# Patient Record
Sex: FEMALE | Race: WHITE | Employment: OTHER
[De-identification: names, ages, dates, MRNs, and addresses within clinical notes are randomized per-mention and may not be internally consistent; named-entity substitution may affect disease eponyms.]

---

## 2017-06-10 ENCOUNTER — HEALTH MAINTENANCE LETTER (OUTPATIENT)
Age: 57
End: 2017-06-10

## 2018-06-08 ENCOUNTER — OFFICE VISIT (OUTPATIENT)
Dept: FAMILY MEDICINE | Facility: CLINIC | Age: 58
End: 2018-06-08
Payer: COMMERCIAL

## 2018-06-08 ENCOUNTER — NURSE TRIAGE (OUTPATIENT)
Dept: NURSING | Facility: CLINIC | Age: 58
End: 2018-06-08

## 2018-06-08 VITALS
RESPIRATION RATE: 16 BRPM | BODY MASS INDEX: 24.93 KG/M2 | HEIGHT: 62 IN | DIASTOLIC BLOOD PRESSURE: 81 MMHG | SYSTOLIC BLOOD PRESSURE: 139 MMHG | WEIGHT: 135.5 LBS | TEMPERATURE: 98.5 F | OXYGEN SATURATION: 98 % | HEART RATE: 87 BPM

## 2018-06-08 DIAGNOSIS — L30.9 PERIORBITAL DERMATITIS: Primary | ICD-10-CM

## 2018-06-08 PROCEDURE — 99213 OFFICE O/P EST LOW 20 MIN: CPT | Performed by: FAMILY MEDICINE

## 2018-06-08 NOTE — PATIENT INSTRUCTIONS
Managing Dermatitis      After bathing, gently pat your skin dry (don t rub). Apply moisturizer while your skin is still damp.   To manage your symptoms and help reduce the severity and frequency, try these self-care tips:  Caring for your skin    Use a gentle, fragrance-free cleanser (or nonsoap cleanser) for bathing. Rinse well. Pat skin dry.    Take warm, not hot, baths or showers. Try to limit them to no more that 10 to 15 minutes.     Use moisturizer liberally right after you bathe, while your skin is still damp.    Avoid scratching because it will cause more damage to your skin.     Topical, over-the-counter hydrocortisone cream may help control mild symptoms.   Controlling your environment    Avoid extreme heat or cold.    Avoid very humid or very dry air.    If your home or office air is very dry, use a humidifier.    Avoid allergens, such as dust, that may be present in bedding, carpets, plush toys, or rugs.    Know that pet hair and dander can cause flare-ups.  Seeking medical treatment  Another way to keep symptoms under control is to seek medical treatment. Talk with your healthcare provider about the type of treatment that may work best for you. Your provider may prescribe treatments such as the following:    Topical treatments to put on the skin daily    Medicines taken by mouth (oral medicines), such as antihistamines, antibiotics, or corticosteroids    In severe cases shots (injections) may be needed to control the symptoms. You may even need antibiotics if skin infections occur.  Treatments don t work the same way for every person. So if your symptoms continue or get worse, ask your healthcare provider about other treatments.  Making lifestyle choices    Manage the stress in your life.    Wear loose-fitting cotton clothing that does not bind or rub your skin.    Avoid contact with wool or other scratchy fabrics.    Use fragrance-free products.  Getting good results  Now that you know more about  atopic dermatitis, the next step is up to you. Follow your healthcare provider s treatment plan and your self-care routine. This will help bring atopic dermatitis under control. If your symptoms persist, be sure to let your health care provider know.   Date Last Reviewed: 2/1/2017 2000-2017 [employer]. [address] [place] [address], [place] [address]. All rights reserved. This information is not intended as a substitute for professional medical care. Always follow your healthcare professional's instructions.

## 2018-06-08 NOTE — NURSING NOTE
"Chief Complaint   Patient presents with     Eye Problem     fluid pooling below left eye for a day     initial /81 (BP Location: Right arm, Cuff Size: Adult Regular)  Pulse 87  Temp 98.5  F (36.9  C) (Oral)  Resp 16  Ht 5' 2\" (1.575 m)  Wt 135 lb 8 oz (61.5 kg)  SpO2 98%  BMI 24.78 kg/m2 Estimated body mass index is 24.78 kg/(m^2) as calculated from the following:    Height as of this encounter: 5' 2\" (1.575 m).    Weight as of this encounter: 135 lb 8 oz (61.5 kg).  BP completed using cuff size: regular.  L  arm      Health Maintenance that is potentially due pending provider review:  Mammogram and Pap Smear    MAMMO/COLON--Gave pt phone number, and pended order for Mammo and/or Colonoscopy    Sinan Saeed ma  "

## 2018-06-08 NOTE — PROGRESS NOTES
"  SUBJECTIVE:   Symone Wellington is a 57 year old female who presents to clinic today for the following health issues:    Chief Complaint   Patient presents with     Eye Problem     fluid pooling below left eye for a day   57-year-old who presents to the clinic for evaluation of swelling and redness underneath her left eye.  Mild swelling and redness has been present for the past 3 days.  She denies any fevers or chills.  Denies any photophobia, vision changes, or excessive lacrimation.  Denies any pain.  She has not tried any medications or ointments for it.  She denies any head or neck surgeries.  Denies any recent eye procedures.    Problem list and histories reviewed & adjusted, as indicated.  Additional history: as documented    There is no problem list on file for this patient.    Past Surgical History:   Procedure Laterality Date     ENT SURGERY      ear surgery       Social History   Substance Use Topics     Smoking status: Never Smoker     Smokeless tobacco: Never Used     Alcohol use No     Family History   Problem Relation Age of Onset     HEART DISEASE Mother 60     quadruple bypass , [place]      HEART DISEASE Father      DIABETES Mother      Arthritis Father            Reviewed and updated as needed this visit by clinical staff  Tobacco  Meds       Reviewed and updated as needed this visit by Provider         ROS:  Constitutional, HEENT, cardiovascular, pulmonary, gi and gu systems are negative, except as otherwise noted.    OBJECTIVE:     /81 (BP Location: Right arm, Cuff Size: Adult Regular)  Pulse 87  Temp 98.5  F (36.9  C) (Oral)  Resp 16  Ht 5' 2\" (1.575 m)  Wt 135 lb 8 oz (61.5 kg)  SpO2 98%  BMI 24.78 kg/m2  Body mass index is 24.78 kg/(m^2).  GENERAL: healthy, alert and no distress  EYES: Eyes grossly normal to inspection, PERRL and conjunctivae and sclerae normal.  Mild swelling of the left lower eyelid with mild erythema.  No fluctuance or bulging mass was noted.  No excessive " lacrimation or eye dryness was noted  RESP: lungs clear to auscultation - no rales, rhonchi or wheezes  CV: regular rate and rhythm, normal S1 S2, no S3 or S4, no murmur, click or rub, no peripheral edema and peripheral pulses strong    Diagnostic Test Results:  none     ASSESSMENT/PLAN:   1. Periorbital dermatitis  Assessment: Isolated swelling of the left lower eyelid is consistent with dermatitis.  Other differential diagnosis includes cellulitis, colitis, dacryoadenitis, or lacrimal gland tumor.  All of which are less likely.  Dermatitis appears to be superficial.  No tenderness to palpation or bulging was noted which makes dacryoadenitis less likely.  The patient was advised to monitor symptoms and apply a moist cloth to alleviate the redness and the swelling.  She was advised to return to clinic if symptoms persisted or got worse.  She was advised to schedule an appointment with an ophthalmologist if she noticed any vision changes  or bulging mass on the medial nasal bridge.    Annalise Rosas MD  [facility]

## 2018-06-08 NOTE — MR AVS SNAPSHOT
After Visit Summary   6/8/2018    Symone Wellington    MRN: [id number]           Patient Information     Date Of Birth          1960        Visit Information        Provider Department      6/8/2018 2:20 PM Annalise Rosas MD [facility]        Today's Diagnoses     Periorbital dermatitis    -  1      Care Instructions      Managing Dermatitis      After bathing, gently pat your skin dry (don t rub). Apply moisturizer while your skin is still damp.   To manage your symptoms and help reduce the severity and frequency, try these self-care tips:  Caring for your skin    Use a gentle, fragrance-free cleanser (or nonsoap cleanser) for bathing. Rinse well. Pat skin dry.    Take warm, not hot, baths or showers. Try to limit them to no more that 10 to 15 minutes.     Use moisturizer liberally right after you bathe, while your skin is still damp.    Avoid scratching because it will cause more damage to your skin.     Topical, over-the-counter hydrocortisone cream may help control mild symptoms.   Controlling your environment    Avoid extreme heat or cold.    Avoid very humid or very dry air.    If your home or office air is very dry, use a humidifier.    Avoid allergens, such as dust, that may be present in bedding, carpets, plush toys, or rugs.    Know that pet hair and dander can cause flare-ups.  Seeking medical treatment  Another way to keep symptoms under control is to seek medical treatment. Talk with your healthcare provider about the type of treatment that may work best for you. Your provider may prescribe treatments such as the following:    Topical treatments to put on the skin daily    Medicines taken by mouth (oral medicines), such as antihistamines, antibiotics, or corticosteroids    In severe cases shots (injections) may be needed to control the symptoms. You may even need antibiotics if skin infections occur.  Treatments don t work the same way for every person. So if your  symptoms continue or get worse, ask your healthcare provider about other treatments.  Making lifestyle choices    Manage the stress in your life.    Wear loose-fitting cotton clothing that does not bind or rub your skin.    Avoid contact with wool or other scratchy fabrics.    Use fragrance-free products.  Getting good results  Now that you know more about atopic dermatitis, the next step is up to you. Follow your healthcare provider s treatment plan and your self-care routine. This will help bring atopic dermatitis under control. If your symptoms persist, be sure to let your health care provider know.   Date Last Reviewed: 2/1/2017 2000-2017 [employer] [address] [place] [address]. All rights reserved. This information is not intended as a substitute for professional medical care. Always follow your healthcare professional's instructions.                Follow-ups after your visit        Who to contact     If you have questions or need follow up information about today's clinic visit or your schedule please contact [facility] directly at [phone].  Normal or non-critical lab and imaging results will be communicated to you by [email or web address], letter or phone within 4 business days after the clinic has received the results. If you do not hear from us within 7 days, please contact the clinic through [facility] or phone. If you have a critical or abnormal lab result, we will notify you by phone as soon as possible.  Submit refill requests through [facility] or call your pharmacy and they will forward the refill request to us. Please allow 3 business days for your refill to be completed.          Additional Information About Your Visit        [facility] Information     [facility] gives you secure access to your electronic health record. If you see a primary care provider, you can also send messages to your care team and make appointments. If you have questions, please call your primary care  "clinic.  If you do not have a primary care provider, please call [phone] and they will assist you.        Care EveryWhere ID     This is your Care EveryWhere ID. This could be used by other organizations to access your [facility] medical records  [id number]        Your Vitals Were     Pulse Temperature Respirations Height Pulse Oximetry BMI (Body Mass Index)    87 98.5  F (36.9  C) (Oral) 16 5' 2\" (1.575 m) 98% 24.78 kg/m2       Blood Pressure from Last 3 Encounters:   06/08/18 139/81   11/17/15 131/81   06/26/15 118/74    Weight from Last 3 Encounters:   06/08/18 135 lb 8 oz (61.5 kg)   06/26/15 130 lb (59 kg)   05/20/15 138 lb (62.6 kg)              Today, you had the following     No orders found for display       Primary Care Provider Fax #    Physician No Ref-Primary [phone]       No address on file        Equal Access to Services     NYA ESTRADA : Hadii shikha riverao Sokathrynali, waaxda luqadaha, qaybta kaalmada adeegyada, rishabh leigh . So [facility] [phone].    ATENCIÓN: Si habla español, tiene a rya disposición servicios gratuitos de asistencia lingüística. Llame al [phone].    We comply with applicable federal civil rights laws and [place] laws. We do not discriminate on the basis of race, color, national origin, age, disability, sex, sexual orientation, or gender identity.            Thank you!     Thank you for choosing [facility]  for your care. Our goal is always to provide you with excellent care. Hearing back from our patients is one way we can continue to improve our services. Please take a few minutes to complete the written survey that you may receive in the mail after your visit with us. Thank you!             Your Updated Medication List - Protect others around you: Learn how to safely use, store and throw away your medicines at www.disposemymeds.org.      Notice  As of 6/8/2018  3:00 PM    You have not been prescribed any medications.      "

## 2018-06-08 NOTE — TELEPHONE ENCOUNTER
"\" Bag of fluid - swelling is 1 inch  and  pinkish -redness under Left eye without pain or itching  started  72 hours ago  And no known cause .  72 hours ago Awoke with R earache and cracking with sounding , but Hx of this as child a lot but not recent  And this resolved  .  Currently : no fever or vision problem and eye looks normal . Triage guideline for Redness , localized - adult with disposition see provider with today if possible .  Agrees to go into be seen today .  .Kelly Reddy RN [facility].      Reason for Disposition    [1] Looks infected (spreading redness, pus) AND [2] no fever    Additional Information    Negative: [1] Sudden onset of rash (within last 2 hours) AND [2] difficulty with breathing or swallowing    Negative: Sounds like a life-threatening emergency to the triager    Negative: Poison ivy, oak, or sumac contact suspected    Negative: Insect bite(s) suspected    Negative: Ringworm suspected (i.e., round pink patch, sometimes looks like ring, usually 1/2 to 1 inch [12-25 mm],  in size, slowly increasing in size)    Negative: Athlete's Foot suspected (i.e., itchy rash between the toes)    Negative: Jock Itch suspected (i.e., itchy rash on inner thighs near genital area)    Negative: Wound infection suspected (i.e., pain, spreading redness, or pus; in a cut, puncture, scrape or sutured wound)    Negative: Impetigo suspected  (i.e., painless infected superficial small sores, less than 1 inch or 2.5 cm, often covered by a soft, yellow-brown scab or crust; sometimes occurring near nasal openings)    Negative: Shingles suspected (i.e., painful rash, multiple small blisters grouped together in one area of body; dermatomal distribution)    Negative: Rash of external female genital area (vulva)    Negative: Rash of penis or scrotum    Negative: Small spot, skin growth, or mole    Negative: Sores or skin ulcer, not a rash    Negative: Localized lump (or swelling) without redness or " rash    Negative: [1] Localized purple or blood-colored spots or dots AND [2] not from injury or friction AND [3] fever    Negative: Patient sounds very sick or weak to the triager    Negative: [1] Red area or streak AND [2] fever    Negative: [1] Rash is painful to touch AND [2] fever    Negative: [1] Looks infected (spreading redness, pus) AND [2] diabetes mellitus or weak immune system (e.g., HIV positive,  cancer chemotherapy, chronic steroid treatment, splenectomy)    Negative: [1] Looks infected (spreading redness, pus) AND [2] large red area (> 2 in. or 5 cm)    Negative: [1] Localized purple or blood-colored spots or dots AND [2] not from injury or friction AND [3] no fever    Protocols used: RASH OR REDNESS - LOCALIZED-ADULT-AH

## 2018-09-24 ENCOUNTER — TELEPHONE (OUTPATIENT)
Dept: FAMILY MEDICINE | Facility: CLINIC | Age: 58
End: 2018-09-24

## 2018-09-24 NOTE — TELEPHONE ENCOUNTER
Summary:    Patient is due/failing the following:   COLONOSCOPY, MAMMOGRAM and PAP    Type of outreach:  Phone, spoke to patient.  states that she is currently traveling and will schedule physical when she returns  Action needed: Patient needs office visit for physical.    If need for provider review:    Please indicate OV, lab, MTM, or nurse appt if needed.  Indicate fasting or not fasting.                                                                                                                                          MAGDALENA Quick

## 2019-09-30 ENCOUNTER — HEALTH MAINTENANCE LETTER (OUTPATIENT)
Age: 59
End: 2019-09-30

## 2020-08-21 ENCOUNTER — TELEPHONE (OUTPATIENT)
Dept: FAMILY MEDICINE | Facility: CLINIC | Age: 60
End: 2020-08-21

## 2020-08-21 NOTE — TELEPHONE ENCOUNTER
Patient called today.    Patient would like to request to est new care with Dr. Khalil at [facility].    Patient states was referred by Dr. Edson Giraldo at [facility].    Please contact patient.    Thank you.    Central Scheduling  Akanksha MILTON

## 2020-09-17 ENCOUNTER — THERAPY VISIT (OUTPATIENT)
Dept: PHYSICAL THERAPY | Facility: CLINIC | Age: 60
End: 2020-09-17
Payer: COMMERCIAL

## 2020-09-17 DIAGNOSIS — M54.6 RIGHT-SIDED THORACIC BACK PAIN: ICD-10-CM

## 2020-09-17 PROCEDURE — 97110 THERAPEUTIC EXERCISES: CPT | Mod: GP | Performed by: PHYSICAL THERAPIST

## 2020-09-17 PROCEDURE — 97161 PT EVAL LOW COMPLEX 20 MIN: CPT | Mod: GP | Performed by: PHYSICAL THERAPIST

## 2020-09-17 NOTE — LETTER
[facility] PHYSICAL [facility]  [address]  [place] [id number] [phone]    2020    Re: Symone Wellington   :   1960  MRN:  [id number]   REFERRING PHYSICIAN:   Edson Giraldo    [facility]    Date of Initial Evaluation:  2020    Visits:  Rxs Used: 1  Reason for Referral:  Right-sided thoracic back pain    EVALUATION SUMMARY    [facility] Initial Evaluation    Therapist Generated HPI Evaluation  Problem details: Back pain and right sided pain that began with pickle ball this summer.  Saw MD for this on 2020.  Was unable to lie down due to pain.  Pain was worse with attempting this.  Has a history of scoliosis.  .         Type of problem:  Lumbar and thoracic.  This is a new condition.  Condition occurred with:  Twisting.  Patient reports pain:  Thoracic spine right.  Radiates to: abdomen.   Associated symptoms:  Loss of strength and loss of motion/stiffness. Symptoms are exacerbated by bending and twisting  and relieved by nothing.  General health as reported by patient is excellent.  Pertinent medical history includes: menopausal.   Red flags:  None as reported by patient.  Medical allergies: none.   Surgeries include:  None.    Current medications:  Other. Other medications details: ibuprofen when needed.    Current occupation is retired.   Oswestry Score: 30 %               Objective:  Standing Alignment:    Lumbar:  Convex scoliosis L  Lumbar/SI Evaluation  ROM:    AROM Lumbar:   Flexion:          Hands to ankles, no pain  Ext:                    Limited   Side Bend:        Left:  Limited, no pain    Right:  Painful on right  Rotation:           Left:     Right:   Side Glide:        Left:     Right:   Lumbar Myotomes:  normal      Lumbar DTR's:  not assessed  Cord Signs:  not assessed  Lumbar Dermtomes:  normal  Neural Tension/Mobility:  Thoracic:   Normal  Lumbar Palpation:    Tenderness present at Right: Quadratus Lumborum and Erector Spinae  Functional Tests:  not assessed  Lumbar Provocation:  not assessed  Cervical/Thoracic Evaluation  AROM:  AROM Cervical:  Flexion:         Extension:        Rotation:         Left: 10 degrees     Right: 20 degrees  Side Bend:      Left:     Right:     Assessment/Plan:    Patient is a 59 year old female with thoracic complaints.    Patient has the following significant findings with corresponding treatment plan.                Diagnosis 1:  Thoracic pain  Pain -  self management, education and home program  Decreased ROM/flexibility - manual therapy, therapeutic exercise and home program  Decreased strength - therapeutic exercise, therapeutic activities and home program  Decreased function - therapeutic activities and home program    Therapy Evaluation Codes:   1) History comprised of:   Personal factors that impact the plan of care:      Time since onset of symptoms.    Comorbidity factors that impact the plan of care are:      None.     Medications impacting care: None.  2) Examination of Body Systems comprised of:   Body structures and functions that impact the plan of care:      Thoracic Spine.   Activity limitations that impact the plan of care are:      Bending, Lifting, Sports, Walking and Sleeping.  3) Clinical presentation characteristics are:   Stable/Uncomplicated.  4) Decision-Making    Low complexity using standardized patient assessment instrument and/or measureable assessment of functional outcome.    Cumulative Therapy Evaluation is: Low complexity.  Previous and current functional limitations:  (See Goal Flow Sheet for this information)    Short term and Long term goals: (See Goal Flow Sheet for this information)   Communication ability:  Patient appears to be able to clearly communicate and understand verbal and written communication and follow directions correctly.  Treatment Explanation - The following  has been discussed with the patient:   RX ordered/plan of care  Anticipated outcomes  Possible risks and side effects  This patient would benefit from PT intervention to resume normal activities.   Rehab potential is excellent.    Frequency:  1 X week, once daily  Duration:  for 6-8 weeks  Discharge Plan:  Independent in home treatment program.  Reach maximal therapeutic benefit.          Thank you for your referral.    INQUIRIES  Therapist:Sonya Muro PT, [facility]  [facility]  [address] [place] [address] [phone]  [facility] [id number]  Phone: [phone]  Fax: [phone]

## 2020-09-17 NOTE — PROGRESS NOTES
[facility] Initial Evaluation  Subjective:  The history is provided by the patient. No  was used.   Therapist Generated HPI Evaluation  Problem details: Back pain and right sided pain that began with pickle ball this summer.  Saw MD for this on 8/25/2020.  Was unable to lie down due to pain.  Pain was worse with attempting this.  Has a history of scoliosis.  .         Type of problem:  Lumbar and thoracic.    This is a new condition.  Condition occurred with:  Twisting.    Patient reports pain:  Thoracic spine right.    Radiates to: abdomen.     Associated symptoms:  Loss of strength and loss of motion/stiffness. Symptoms are exacerbated by bending and twisting  and relieved by nothing.                              Objective:  Standing Alignment:        Lumbar:  Convex scoliosis L                           Lumbar/SI Evaluation  ROM:    AROM Lumbar:   Flexion:          Hands to ankles, no pain  Ext:                    Limited   Side Bend:        Left:  Limited, no pain    Right:  Painful on right  Rotation:           Left:     Right:   Side Glide:        Left:     Right:           Lumbar Myotomes:  normal            Lumbar DTR's:  not assessed      Cord Signs:  not assessed    Lumbar Dermtomes:  normal                Neural Tension/Mobility:  Thoracic:  Normal      Lumbar Palpation:      Tenderness present at Right: Quadratus Lumborum and Erector Spinae  Functional Tests:  not assessed        Lumbar Provocation:  not assessed             Cervical/Thoracic Evaluation    AROM:  AROM Cervical:    Flexion:         Extension:        Rotation:         Left: 10 degrees     Right: 20 degrees  Side Bend:      Left:     Right:                                                                   General     ROS    Assessment/Plan:    Patient is a 59 year old female with thoracic complaints.    Patient has the following significant findings with corresponding treatment plan.                 Diagnosis 1:  Thoracic pain  Pain -  self management, education and home program  Decreased ROM/flexibility - manual therapy, therapeutic exercise and home program  Decreased strength - therapeutic exercise, therapeutic activities and home program  Decreased function - therapeutic activities and home program    Therapy Evaluation Codes:   1) History comprised of:   Personal factors that impact the plan of care:      Time since onset of symptoms.    Comorbidity factors that impact the plan of care are:      None.     Medications impacting care: None.  2) Examination of Body Systems comprised of:   Body structures and functions that impact the plan of care:      Thoracic Spine.   Activity limitations that impact the plan of care are:      Bending, Lifting, Sports, Walking and Sleeping.  3) Clinical presentation characteristics are:   Stable/Uncomplicated.  4) Decision-Making    Low complexity using standardized patient assessment instrument and/or measureable assessment of functional outcome.  Cumulative Therapy Evaluation is: Low complexity.    Previous and current functional limitations:  (See Goal Flow Sheet for this information)    Short term and Long term goals: (See Goal Flow Sheet for this information)     Communication ability:  Patient appears to be able to clearly communicate and understand verbal and written communication and follow directions correctly.  Treatment Explanation - The following has been discussed with the patient:   RX ordered/plan of care  Anticipated outcomes  Possible risks and side effects  This patient would benefit from PT intervention to resume normal activities.   Rehab potential is excellent.    Frequency:  1 X week, once daily  Duration:  for 6-8 weeks  Discharge Plan:  Independent in home treatment program.  Reach maximal therapeutic benefit.    Please refer to the daily flowsheet for treatment today, total treatment time and time spent performing 1:1 timed codes.

## 2020-09-21 NOTE — PROGRESS NOTES
[facility] Initial Evaluation  Subjective:    Patient Health History           General health as reported by patient is excellent.  Pertinent medical history includes: menopausal.   Red flags:  None as reported by patient.  Medical allergies: none.   Surgeries include:  None.    Current medications:  Other. Other medications details: ibuprofen when needed.    Current occupation is retired.                     Oswestry Score: 30 %                 Objective:  System    Physical Exam    General     ROS    Assessment/Plan:

## 2020-09-23 ENCOUNTER — OFFICE VISIT (OUTPATIENT)
Dept: FAMILY MEDICINE | Facility: CLINIC | Age: 60
End: 2020-09-23
Payer: COMMERCIAL

## 2020-09-23 VITALS
HEIGHT: 62 IN | DIASTOLIC BLOOD PRESSURE: 84 MMHG | OXYGEN SATURATION: 97 % | TEMPERATURE: 96.9 F | WEIGHT: 124 LBS | HEART RATE: 87 BPM | SYSTOLIC BLOOD PRESSURE: 140 MMHG | BODY MASS INDEX: 22.82 KG/M2

## 2020-09-23 DIAGNOSIS — Z00.00 ROUTINE GENERAL MEDICAL EXAMINATION AT A HEALTH CARE FACILITY: Primary | ICD-10-CM

## 2020-09-23 DIAGNOSIS — G57.63 MORTON'S NEUROMA OF BOTH FEET: ICD-10-CM

## 2020-09-23 DIAGNOSIS — Z12.11 SCREEN FOR COLON CANCER: ICD-10-CM

## 2020-09-23 DIAGNOSIS — Z78.0 POST-MENOPAUSAL: ICD-10-CM

## 2020-09-23 DIAGNOSIS — Z11.59 NEED FOR HEPATITIS C SCREENING TEST: ICD-10-CM

## 2020-09-23 DIAGNOSIS — Z11.4 SCREENING FOR HIV (HUMAN IMMUNODEFICIENCY VIRUS): ICD-10-CM

## 2020-09-23 DIAGNOSIS — Z12.4 SCREENING FOR MALIGNANT NEOPLASM OF CERVIX: ICD-10-CM

## 2020-09-23 LAB
ALBUMIN SERPL-MCNC: 4.1 G/DL (ref 3.4–5)
ALP SERPL-CCNC: 58 U/L (ref 40–150)
ALT SERPL W P-5'-P-CCNC: 25 U/L (ref 0–50)
ANION GAP SERPL CALCULATED.3IONS-SCNC: 7 MMOL/L (ref 3–14)
AST SERPL W P-5'-P-CCNC: 18 U/L (ref 0–45)
BILIRUB SERPL-MCNC: 0.5 MG/DL (ref 0.2–1.3)
BUN SERPL-MCNC: 14 MG/DL (ref 7–30)
CALCIUM SERPL-MCNC: 9.1 MG/DL (ref 8.5–10.1)
CHLORIDE SERPL-SCNC: 108 MMOL/L (ref 94–109)
CHOLEST SERPL-MCNC: 256 MG/DL
CO2 SERPL-SCNC: 25 MMOL/L (ref 20–32)
CREAT SERPL-MCNC: 1.06 MG/DL (ref 0.52–1.04)
ERYTHROCYTE [DISTWIDTH] IN BLOOD BY AUTOMATED COUNT: 13.5 % (ref 10–15)
GFR SERPL CREATININE-BSD FRML MDRD: 57 ML/MIN/{1.73_M2}
GLUCOSE SERPL-MCNC: 96 MG/DL (ref 70–99)
HCT VFR BLD AUTO: 37 % (ref 35–47)
HDLC SERPL-MCNC: 71 MG/DL
HGB BLD-MCNC: 12.6 G/DL (ref 11.7–15.7)
LDLC SERPL CALC-MCNC: 166 MG/DL
MCH RBC QN AUTO: 31.7 PG (ref 26.5–33)
MCHC RBC AUTO-ENTMCNC: 34.1 G/DL (ref 31.5–36.5)
MCV RBC AUTO: 93 FL (ref 78–100)
NONHDLC SERPL-MCNC: 185 MG/DL
PLATELET # BLD AUTO: 268 10E9/L (ref 150–450)
POTASSIUM SERPL-SCNC: 4.3 MMOL/L (ref 3.4–5.3)
PROT SERPL-MCNC: 7.3 G/DL (ref 6.8–8.8)
RBC # BLD AUTO: 3.97 10E12/L (ref 3.8–5.2)
SODIUM SERPL-SCNC: 140 MMOL/L (ref 133–144)
TRIGL SERPL-MCNC: 94 MG/DL
WBC # BLD AUTO: 4.9 10E9/L (ref 4–11)

## 2020-09-23 PROCEDURE — G0123 SCREEN CERV/VAG THIN LAYER: HCPCS | Performed by: INTERNAL MEDICINE

## 2020-09-23 PROCEDURE — 99386 PREV VISIT NEW AGE 40-64: CPT | Performed by: INTERNAL MEDICINE

## 2020-09-23 PROCEDURE — 86803 HEPATITIS C AB TEST: CPT | Performed by: INTERNAL MEDICINE

## 2020-09-23 PROCEDURE — 85027 COMPLETE CBC AUTOMATED: CPT | Performed by: INTERNAL MEDICINE

## 2020-09-23 PROCEDURE — 87624 HPV HI-RISK TYP POOLED RSLT: CPT | Performed by: INTERNAL MEDICINE

## 2020-09-23 PROCEDURE — 80053 COMPREHEN METABOLIC PANEL: CPT | Performed by: INTERNAL MEDICINE

## 2020-09-23 PROCEDURE — 36415 COLL VENOUS BLD VENIPUNCTURE: CPT | Performed by: INTERNAL MEDICINE

## 2020-09-23 PROCEDURE — 87389 HIV-1 AG W/HIV-1&-2 AB AG IA: CPT | Performed by: INTERNAL MEDICINE

## 2020-09-23 PROCEDURE — 80061 LIPID PANEL: CPT | Performed by: INTERNAL MEDICINE

## 2020-09-23 ASSESSMENT — MIFFLIN-ST. JEOR: SCORE: 1084.36

## 2020-09-23 NOTE — PATIENT INSTRUCTIONS
(Z00.00) Routine general medical examination at a health care facility  (primary encounter diagnosis)  Comment: For routine exam, we will draw labs as ordered, cholesterol, diabetes mellitus check, liver function, renal function, and refer for colonoscopy.  We will also update vaccination history.  [facility] (also performs diagnostic mammogram, ultrasound and biopsy) [phone]  Plan: MA SCREENING DIGITAL BILAT - Future  (s+30),         HIV Antigen Antibody Combo, Lipid panel reflex         to direct LDL Fasting, Comprehensive metabolic         panel (BMP + Alb, Alk Phos, ALT, AST, Total.         Bili, TP), CBC with platelets            (Z11.59) Need for hepatitis C screening test  Comment:   Plan: Hepatitis C Screen Reflex to HCV RNA Quant and         Genotype            (Z12.11) Screen for colon cancer  Comment:   Plan: Fecal colorectal cancer screen FIT - Future         (S+30), GASTROENTEROLOGY ADULT REF PROCEDURE         ONLY            (Z11.4) Screening for HIV (human immunodeficiency virus)  Comment:   Plan:     (Z12.4) Screening for malignant neoplasm of cervix  Comment:   Plan: Pap imaged thin layer screen with HPV -         recommended age 30 - 65 years (select HPV order        below), HPV High Risk Types DNA Cervical           Over the Counter Inserts    Super Feet are the most common and easiest to find.    Locations include any Leg[employer] Store, [employer] in [place] on [address]  and in [place] on [address], [employer] Running Room in [place] on [address], [facility] Running Room in [place] on [address], [employer] in [place] on [address]  and [employer] Sport Shop in [place] on [address] and in [place] on [address].    Spenco can be found online and at yepme.come Shop in [place] on [address], [employer] in [facility] on [address], Gear Running Store in [place] on [address], [employer]Z in [place] on [address]  and [employer] in [place] on [address].    Power Step can be a little harder to find.  Locations include [employer] in [place] on [address], [employer] in [place], Stop-over Store in [place] on [address] and online    Walk-Fit - Target     [employer] - [facility]    **  A good high quality over the counter insert can cost around $40-$50.    [place] [facility] LOCATIONS  [facility]  [address]  [place] [address]  Phone: [phone]  Fax: [phone] [facility]  [address]  [place] [address]  Phone: [phone]   Fax: [phone]   [facility]  [address]  [place] [address]  Phone: [phone]  Fax: [phone] [facility]  [address]  [place] [address]  Phone: [phone]   Fax: [phone]   [facility]   [address]  [place] [address]  Phone: [phone]  Fax: [phone] * Please call any location listed to make an appointment for a casting/fitting. Your referral was sent to their central office and they will all have the order on file.

## 2020-09-23 NOTE — PROGRESS NOTES
SUBJECTIVE:   CC: Symone Wellington is an 59 year old woman who presents for preventive health visit.       Patient has been advised of split billing requirements and indicates understanding: Yes  Healthy Habits:     Getting at least 3 servings of Calcium per day:  Yes    Bi-annual eye exam:  Yes    Dental care twice a year:  Yes    Sleep apnea or symptoms of sleep apnea:  None    Diet:  Regular (no restrictions)    Frequency of exercise:  6-7 days/week    Duration of exercise:  45-60 minutes    Taking medications regularly:  Not Applicable    Barriers to taking medications:  Not applicable    Medication side effects:  Not applicable    PHQ-2 Total Score: 0    Additional concerns today:  No        Today's PHQ-2 Score:   PHQ-2 ( 1999 Pfizer) 5/20/2015   Q1: Little interest or pleasure in doing things 0   Q2: Feeling down, depressed or hopeless 0   PHQ-2 Score 0       Abuse: Current or Past (Physical, Sexual or Emotional) - No  Do you feel safe in your environment? Yes    Have you ever done Advance Care Planning? (For example, a Health Directive, POLST, or a discussion with a medical provider or your loved ones about your wishes): Yes, patient states has an Advance Care Planning document and will bring a copy to the clinic.    Social History     Tobacco Use     Smoking status: Never Smoker     Smokeless tobacco: Never Used   Substance Use Topics     Alcohol use: No     If you drink alcohol do you typically have >3 drinks per day or >7 drinks per week? No    No flowsheet data found.    Reviewed orders with patient.  Reviewed health maintenance and updated orders accordingly - Yes      Lab work is in process  Labs reviewed in [facility]    Mammogram Screening: Patient over age 50, mutual decision to screen reflected in health maintenance.    Pertinent mammograms are reviewed under the imaging tab.  History of abnormal Pap smear: NO - age 30- 65 PAP every 3 years recommended     Reviewed and updated as needed this visit by  "clinical staff         Reviewed and updated as needed this visit by Provider        No past medical history on file.     Review of Systems  CONSTITUTIONAL: NEGATIVE for fever, chills, change in weight  INTEGUMENTARY/SKIN: NEGATIVE for worrisome rashes, moles or lesions  EYES: NEGATIVE for vision changes or irritation  ENT: NEGATIVE for ear, mouth and throat problems  RESP: NEGATIVE for significant cough or SOB  BREAST: NEGATIVE for masses, tenderness or discharge  CV: NEGATIVE for chest pain, palpitations or peripheral edema  GI: NEGATIVE for nausea, abdominal pain, heartburn, or change in bowel habits  : NEGATIVE for unusual urinary or vaginal symptoms. No vaginal bleeding.  MUSCULOSKELETAL: + beck neuroma  NEURO: NEGATIVE for weakness, dizziness or paresthesias  PSYCHIATRIC: NEGATIVE for changes in mood or affect      OBJECTIVE:   BP (!) 140/84 (BP Location: Left arm, Patient Position: Sitting, Cuff Size: Adult Regular)   Pulse 87   Temp 96.9  F (36.1  C) (Temporal)   Ht 1.565 m (5' 1.6\")   Wt 56.2 kg (124 lb)   SpO2 97%   BMI 22.98 kg/m    Physical Exam  GENERAL: healthy, alert and no distress  EYES: Eyes grossly normal to inspection, PERRL and conjunctivae and sclerae normal  HENT: ear canals and TM's normal, nose and mouth without ulcers or lesions  NECK: no adenopathy, no asymmetry, masses, or scars and thyroid normal to palpation  RESP: lungs clear to auscultation - no rales, rhonchi or wheezes  BREAST: normal without masses, tenderness or nipple discharge and no palpable axillary masses or adenopathy  CV: regular rate and rhythm, normal S1 S2, no S3 or S4, no murmur, click or rub, no peripheral edema and peripheral pulses strong  ABDOMEN: soft, nontender, no hepatosplenomegaly, no masses and bowel sounds normal   (female): normal female external genitalia, normal urethral meatus, vaginal mucosa pink, moist, well rugated, and normal cervix/adnexa/uterus without masses or discharge  MS: no gross " "musculoskeletal defects noted, no edema  SKIN: no suspicious lesions or rashes  NEURO: Normal strength and tone, mentation intact and speech normal  PSYCH: mentation appears normal, affect normal/bright    Diagnostic Test Results:  Labs reviewed in Epic    ASSESSMENT/PLAN:     Patient Instructions     (Z00.00) Routine general medical examination at a health care facility  (primary encounter diagnosis)  Comment: For routine exam, we will draw labs as ordered, cholesterol, diabetes mellitus check, liver function, renal function, and refer for colonoscopy.  We will also update vaccination history.  [facility] (also performs diagnostic mammogram, ultrasound and biopsy) [phone]  Plan: MA SCREENING DIGITAL BILAT - Future  (s+30),         HIV Antigen Antibody Combo, Lipid panel reflex         to direct LDL Fasting, Comprehensive metabolic         panel (BMP + Alb, Alk Phos, ALT, AST, Total.         Bili, TP), CBC with platelets            (Z11.59) Need for hepatitis C screening test  Comment:   Plan: Hepatitis C Screen Reflex to HCV RNA Quant and         Genotype            (Z12.11) Screen for colon cancer  Comment:   Plan: Fecal colorectal cancer screen FIT - Future         (S+30), GASTROENTEROLOGY ADULT REF PROCEDURE         ONLY            (Z11.4) Screening for HIV (human immunodeficiency virus)  Comment:   Plan:     (Z12.4) Screening for malignant neoplasm of cervix  Comment:   Plan: Pap imaged thin layer screen with HPV -         recommended age 30 - 65 years (select HPV order        below), HPV High Risk Types DNA Cervical          mortons' neuroma  Orthotic prescription signed       Patient has been advised of split billing requirements and indicates understanding: Yes  COUNSELING:  Reviewed preventive health counseling, as reflected in patient instructions    Estimated body mass index is 24.78 kg/m  as calculated from the following:    Height as of 6/8/18: 1.575 m (5' 2\").    Weight as of " 6/8/18: 61.5 kg (135 lb 8 oz).        She reports that she has never smoked. She has never used smokeless tobacco.      Counseling Resources:  ATP IV Guidelines  Pooled Cohorts Equation Calculator  Breast Cancer Risk Calculator  BRCA-Related Cancer Risk Assessment: FHS-7 Tool  FRAX Risk Assessment  ICSI Preventive Guidelines  Dietary Guidelines for Americans, 2010  [employer]'s MyPlate  ASA Prophylaxis  Lung CA Screening    Dann Khalil MD, MD  [facility]

## 2020-09-24 LAB
HCV AB SERPL QL IA: NONREACTIVE
HIV 1+2 AB+HIV1 P24 AG SERPL QL IA: NONREACTIVE

## 2020-09-29 LAB
COPATH REPORT: NORMAL
PAP: NORMAL

## 2020-09-30 LAB
FINAL DIAGNOSIS: NORMAL
HPV HR 12 DNA CVX QL NAA+PROBE: NEGATIVE
HPV16 DNA SPEC QL NAA+PROBE: NEGATIVE
HPV18 DNA SPEC QL NAA+PROBE: NEGATIVE
SPECIMEN DESCRIPTION: NORMAL
SPECIMEN SOURCE CVX/VAG CYTO: NORMAL

## 2020-10-05 ENCOUNTER — HOSPITAL ENCOUNTER (OUTPATIENT)
Facility: CLINIC | Age: 60
End: 2020-10-05
Attending: SPECIALIST | Admitting: SPECIALIST
Payer: COMMERCIAL

## 2020-10-06 DIAGNOSIS — Z11.59 ENCOUNTER FOR SCREENING FOR OTHER VIRAL DISEASES: Primary | ICD-10-CM

## 2020-10-07 ENCOUNTER — MEDICAL CORRESPONDENCE (OUTPATIENT)
Dept: HEALTH INFORMATION MANAGEMENT | Facility: CLINIC | Age: 60
End: 2020-10-07

## 2020-10-15 ENCOUNTER — HOSPITAL ENCOUNTER (OUTPATIENT)
Dept: MAMMOGRAPHY | Facility: CLINIC | Age: 60
End: 2020-10-15
Attending: INTERNAL MEDICINE
Payer: COMMERCIAL

## 2020-10-15 ENCOUNTER — HOSPITAL ENCOUNTER (OUTPATIENT)
Dept: BONE DENSITY | Facility: CLINIC | Age: 60
End: 2020-10-15
Attending: INTERNAL MEDICINE
Payer: COMMERCIAL

## 2020-10-15 DIAGNOSIS — Z78.0 POST-MENOPAUSAL: ICD-10-CM

## 2020-10-15 DIAGNOSIS — Z00.00 ROUTINE GENERAL MEDICAL EXAMINATION AT A HEALTH CARE FACILITY: ICD-10-CM

## 2020-10-15 PROCEDURE — 77067 SCR MAMMO BI INCL CAD: CPT

## 2020-10-15 PROCEDURE — 77080 DXA BONE DENSITY AXIAL: CPT

## 2020-10-18 ENCOUNTER — TELEPHONE (OUTPATIENT)
Dept: FAMILY MEDICINE | Facility: CLINIC | Age: 60
End: 2020-10-18

## 2020-10-18 NOTE — RESULT ENCOUNTER NOTE
Darwin Ashby,    I had the opportunity to review your recent labs and a summary of your labs reads as follows:    Your bone density scan does show evidence of osteopenia (thinning of the bones and increased risk of fracture).  At this time it is recommended that you start calcium and vitamin D to 500 mg of calcium twice per day and 200 units of vitamin D twice per day.      Sincerely,  Dann Khalil MD

## 2020-10-18 NOTE — TELEPHONE ENCOUNTER
Can we call Symone Wellington and let her know that      Darwin Ashby,    I had the opportunity to review your recent labs and a summary of your labs reads as follows:    Your bone density scan does show evidence of osteopenia (thinning of the bones and increased risk of fracture).  At this time it is recommended that you start calcium and vitamin D to 500 mg of calcium twice per day and 200 units of vitamin D twice per day. Over the counter       Sincerely,  Dann Khalil MD

## 2020-10-19 NOTE — TELEPHONE ENCOUNTER
Left VM for pt to return call. Clarifying possible typo in previous message - Can you please verify dose on vitamin D recommendation? Do you want her to take 2000 international unit(s), or 200 international unit(s) daily?  Thank you!

## 2020-10-19 NOTE — TELEPHONE ENCOUNTER
Combined (calcium + vitamin D tablet) (500 mg calcium + 200 units vitamin D) twice per day    Dann Khalil MD, MD

## 2020-10-21 PROBLEM — M54.6 RIGHT-SIDED THORACIC BACK PAIN: Status: RESOLVED | Noted: 2020-09-17 | Resolved: 2020-10-21

## 2020-10-21 NOTE — PROGRESS NOTES
Patient seen for one time evaluation and treatment.  Patient did not return for further treatment and current status is unknown. Patient canceled one follow up visit. Please see initial evaluation for further information.

## 2020-11-16 DIAGNOSIS — Z11.59 ENCOUNTER FOR SCREENING FOR OTHER VIRAL DISEASES: ICD-10-CM

## 2020-11-16 PROCEDURE — U0003 INFECTIOUS AGENT DETECTION BY NUCLEIC ACID (DNA OR RNA); SEVERE ACUTE RESPIRATORY SYNDROME CORONAVIRUS 2 (SARS-COV-2) (CORONAVIRUS DISEASE [COVID-19]), AMPLIFIED PROBE TECHNIQUE, MAKING USE OF HIGH THROUGHPUT TECHNOLOGIES AS DESCRIBED BY CMS-2020-01-R: HCPCS | Performed by: SPECIALIST

## 2020-11-17 LAB
SARS-COV-2 RNA SPEC QL NAA+PROBE: NOT DETECTED
SPECIMEN SOURCE: NORMAL

## 2020-11-18 ENCOUNTER — NURSE TRIAGE (OUTPATIENT)
Dept: NURSING | Facility: CLINIC | Age: 60
End: 2020-11-18

## 2020-11-18 NOTE — TELEPHONE ENCOUNTER
"Called Pt:     Last emesis 1 hour ago--     She states she has gone this long without vomiting previously     Pt feels as though her stomach is \"not flipping as much\" and she may be turning the corner     Still voiding and hydrating     I don't feel ER/UC is necessary at this moment.     Pt is nervous to proceed further with the colonoscopy this time (will likely cancel tomorrow's test), but is also nervous to try again.    Denies racing heart/palpitations   Denies fever   Denies feeling sick     Discussed anti-emetic meds: Pt notes that she has a history of a reaction to some sort of anti-emetic. Since she feels she is turning the corner, would prefer not to try another.     R: talk to Dr. Esquivel's office about officially going through with or canceling tomorrow's procedure. If Canceling, purchase unflavored Pedialyte to rehydrate (pt does not care for Gatorade) try bland crackers/pretzels. If vomits again, wait to eat or drink     IF she continues to vomit, recommending ER for controlled environment of administration of anti-emetic (if appropriate) and IVF    Pt verbalized agreement to plan      To PCP: Can she start with the FIT test? Of note, inquired about family hx and she states her sister recently had a colonoscopy with a biopsy, unsure of results.      Shirley MCKEE RN    "

## 2020-11-18 NOTE — TELEPHONE ENCOUNTER
She has a FIT ordered by Dr. Khalil in Sept, can we send her a kit ?  I think that would be the best place to start.  Agree with advice to go to ER if she cannot manage nausea / vomiting symptoms at home

## 2020-11-18 NOTE — TELEPHONE ENCOUNTER
"Patient is feeling the same, drinking fluids and having vomiting. She has had multiple episodes since this morning. She has \"lost track of how many\". Advised a second level triage to discuss with a provider and maybe use ADS if appropriate for IV hydration.     Please call patient for secondary triage.     Monika Starks RN on 11/18/2020 at 12:51 PM    "

## 2020-11-18 NOTE — TELEPHONE ENCOUNTER
Guevara Barber MD to You   2 minutes ago    I saw your note. I called and talked with Symone. I told her when people have trouble with citrate of magnesia, they vomit once or twice and then its done. She was still vomiting this morning, perhaps 10 to 12 times since last night and that is unusual. No abdominal or chest pain. She is still urinating and doesn't feel lightheaded. She sounds okay over the phone. Nevertheless, if this persists into the afternoon, I recommended she abort plans to start the prep (we'll reschedule the procedure) and she should get checked out by her primary caregiver; if that is not possible, she should go to an urgent care center. Guevara

## 2020-11-18 NOTE — TELEPHONE ENCOUNTER
Routing to triage - please review. Dr. Khalil is out of the office this week and next. Patient will need to see ER or covering provider if needed.

## 2020-11-18 NOTE — TELEPHONE ENCOUNTER
"Colonoscopy tomorrow am.  Mag Citrate for prep was finished by 730pm. Drank it and vomiting and began less than one hour later.   Vomited \"a lot\". 8-10 times. Vomiting clear liquids.   She is hydrated, drink water.   Last time she vomited one hour ago.   Talking makes her nauseated.   She thinks maybe there is an artifical flavoring in the product and she does not think she tolerates the drink.   Declined advice to go to the ED. She will call her GI doctor.    Monika Starks RN on 11/18/2020 at 10:47 AM            Reason for Disposition    SEVERE vomiting (e.g., 6 or more times/day)    Additional Information    Negative: Shock suspected (e.g., cold/pale/clammy skin, too weak to stand, low BP, rapid pulse)    Negative: Difficult to awaken or acting confused (e.g., disoriented, slurred speech)    Negative: Sounds like a life-threatening emergency to the triager    Negative: Vomiting occurs only while coughing    Negative: Pregnant < 20 Weeks and nausea/vomiting began in early pregnancy (i.e., 4-8 weeks pregnant)    Negative: Chest pain    Negative: Headache is main symptom    Protocols used: VOMITING-A-OH      "

## 2021-01-06 ENCOUNTER — HOSPITAL ENCOUNTER (OUTPATIENT)
Dept: RESEARCH | Facility: CLINIC | Age: 61
End: 2021-01-06

## 2021-03-04 ENCOUNTER — TELEPHONE (OUTPATIENT)
Dept: OPHTHALMOLOGY | Facility: CLINIC | Age: 61
End: 2021-03-04

## 2021-03-04 ENCOUNTER — OFFICE VISIT (OUTPATIENT)
Dept: OPHTHALMOLOGY | Facility: CLINIC | Age: 61
End: 2021-03-04
Attending: OPHTHALMOLOGY
Payer: COMMERCIAL

## 2021-03-04 DIAGNOSIS — H52.203 MYOPIA WITH ASTIGMATISM, BILATERAL: ICD-10-CM

## 2021-03-04 DIAGNOSIS — H40.053 OCULAR HYPERTENSION, BILATERAL: ICD-10-CM

## 2021-03-04 DIAGNOSIS — H52.13 MYOPIA WITH ASTIGMATISM, BILATERAL: ICD-10-CM

## 2021-03-04 DIAGNOSIS — H43.812 PVD (POSTERIOR VITREOUS DETACHMENT), LEFT EYE: Primary | ICD-10-CM

## 2021-03-04 PROCEDURE — G0463 HOSPITAL OUTPT CLINIC VISIT: HCPCS

## 2021-03-04 PROCEDURE — 92004 COMPRE OPH EXAM NEW PT 1/>: CPT | Mod: GC | Performed by: STUDENT IN AN ORGANIZED HEALTH CARE EDUCATION/TRAINING PROGRAM

## 2021-03-04 ASSESSMENT — VISUAL ACUITY
CORRECTION_TYPE: GLASSES
OS_CC: 20/20
OD_CC: 20/20
METHOD: SNELLEN - LINEAR

## 2021-03-04 ASSESSMENT — REFRACTION_WEARINGRX
OS_SPHERE: -4.00
OD_CYLINDER: +1.00
OS_AXIS: 170
OD_SPHERE: -3.00
OD_AXIS: 005
OS_CYLINDER: +1.25

## 2021-03-04 ASSESSMENT — SLIT LAMP EXAM - LIDS
COMMENTS: MILD BLEPHARITIS
COMMENTS: MILD BLEPHARITIS

## 2021-03-04 ASSESSMENT — CONF VISUAL FIELD
OS_NORMAL: 1
METHOD: COUNTING FINGERS
OD_NORMAL: 1

## 2021-03-04 ASSESSMENT — CUP TO DISC RATIO
OD_RATIO: 0.2
OS_RATIO: 0.3

## 2021-03-04 ASSESSMENT — TONOMETRY
IOP_METHOD: TONOPEN
OS_IOP_MMHG: 21
OD_IOP_MMHG: 23

## 2021-03-04 ASSESSMENT — EXTERNAL EXAM - LEFT EYE: OS_EXAM: NORMAL

## 2021-03-04 ASSESSMENT — EXTERNAL EXAM - RIGHT EYE: OD_EXAM: NORMAL
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
## 2021-03-17 ENCOUNTER — HOSPITAL ENCOUNTER (OUTPATIENT)
Dept: RESEARCH | Facility: CLINIC | Age: 61
End: 2021-03-17
Payer: COMMERCIAL

## 2021-03-17 PROCEDURE — 510N000017 HC CRU PATIENT CARE, PER 15 MIN

## 2021-03-17 PROCEDURE — 510N000009 HC RESEARCH FACILITY, PER 15 MIN

## 2021-04-07 ENCOUNTER — HOSPITAL ENCOUNTER (OUTPATIENT)
Dept: RESEARCH | Facility: CLINIC | Age: 61
End: 2021-04-07
Payer: COMMERCIAL

## 2021-04-07 PROCEDURE — 510N000017 HC CRU PATIENT CARE, PER 15 MIN

## 2021-04-07 PROCEDURE — 510N000009 HC RESEARCH FACILITY, PER 15 MIN

## 2021-04-08 ENCOUNTER — OFFICE VISIT (OUTPATIENT)
Dept: OPHTHALMOLOGY | Facility: CLINIC | Age: 61
End: 2021-04-08
Attending: OPHTHALMOLOGY
Payer: COMMERCIAL

## 2021-04-08 DIAGNOSIS — H52.203 MYOPIA WITH ASTIGMATISM, BILATERAL: ICD-10-CM

## 2021-04-08 DIAGNOSIS — H25.13 AGE-RELATED NUCLEAR CATARACT OF BOTH EYES: ICD-10-CM

## 2021-04-08 DIAGNOSIS — H43.812 PVD (POSTERIOR VITREOUS DETACHMENT), LEFT EYE: Primary | ICD-10-CM

## 2021-04-08 DIAGNOSIS — H40.053 OCULAR HYPERTENSION, BILATERAL: ICD-10-CM

## 2021-04-08 DIAGNOSIS — H52.13 MYOPIA WITH ASTIGMATISM, BILATERAL: ICD-10-CM

## 2021-04-08 PROCEDURE — 92014 COMPRE OPH EXAM EST PT 1/>: CPT | Mod: GC | Performed by: STUDENT IN AN ORGANIZED HEALTH CARE EDUCATION/TRAINING PROGRAM

## 2021-04-08 PROCEDURE — G0463 HOSPITAL OUTPT CLINIC VISIT: HCPCS

## 2021-04-08 ASSESSMENT — SLIT LAMP EXAM - LIDS
COMMENTS: MILD BLEPHARITIS
COMMENTS: MILD BLEPHARITIS

## 2021-04-08 ASSESSMENT — REFRACTION_WEARINGRX
OD_AXIS: 005
OD_CYLINDER: +1.00
OS_SPHERE: -4.00
OS_AXIS: 170
OD_SPHERE: -3.00
OS_CYLINDER: +1.25

## 2021-04-08 ASSESSMENT — VISUAL ACUITY
METHOD: SNELLEN - LINEAR
OS_PH_CC: 20/20
CORRECTION_TYPE: GLASSES
OS_CC: 20/25
OD_CC: 20/15

## 2021-04-08 ASSESSMENT — CONF VISUAL FIELD
METHOD: COUNTING FINGERS
OD_NORMAL: 1
OS_NORMAL: 1

## 2021-04-08 ASSESSMENT — CUP TO DISC RATIO
OD_RATIO: 0.2
OS_RATIO: 0.3

## 2021-04-08 ASSESSMENT — TONOMETRY
OS_IOP_MMHG: 18
OD_IOP_MMHG: 18
IOP_METHOD: TONOPEN

## 2021-04-08 ASSESSMENT — EXTERNAL EXAM - LEFT EYE: OS_EXAM: NORMAL

## 2021-04-08 ASSESSMENT — EXTERNAL EXAM - RIGHT EYE: OD_EXAM: NORMAL

## 2021-04-08 NOTE — PROGRESS NOTES
HPI     Posterior Vitreous Detachment Follow Up      Additional comments: 5 week follow up LE              Comments     Pt reports less floaters in LE. No flashes.   No eye pain today. No redness or dryness.  No changes in vision.    JAMAL Leos April 8, 2021 12:37 PM              Last edited by Humaira Spears COMT on 4/8/2021 12:38 PM. (History)        POH: Myopia with astigmatism  GTTs: None  PMH: Negative  FH: Father with AMD    Assessment & Plan    1. Posterior Vitreous Detachment, Left Eye   - New symptoms starting 3/2/2021   - No holes or tears seen on repeat 360 SD exam today   - RT/RD precautions reviewed. Patient to call with any changes    2. Myopia with Astigmatism, Both Eyes   - Wears glasses for distance only, takes glasses off to read   - Continue with current Rx    3. Ocular Hypertension Both Eyes   - Borderline IOP at last visit (23/21), 18 OU today   - No personal or family hx of glaucoma   - Healthy appearing optic discs   - Monitor for now     4. Cataracts Both Eyes   - Mild, monitor for now    Return in about 1 year (around 4/8/2022) for Annual Visit. Sooner as needed.    Seen and discussed with Dr. Reyna.    Jai Zaidi MD  Ophthalmology Resident, PGY-4    Attending Physician Attestation:  Complete documentation of historical and exam elements from today's encounter can be found in the full encounter summary report (not reduplicated in this progress note).  I personally obtained the chief complaint(s) and history of present illness.  I confirmed and edited as necessary the review of systems, past medical/surgical history, family history, social history, and examination findings as documented by others; and I examined the patient myself.  I personally reviewed the relevant tests, images, and reports as documented above.  I formulated and edited as necessary the assessment and plan and discussed the findings and management plan with the patient and family. . Chi MCKEE  MD Mar[place]

## 2021-04-08 NOTE — NURSING NOTE
Chief Complaints and History of Present Illnesses   Patient presents with     Posterior Vitreous Detachment Follow Up     5 week follow up LE     Chief Complaint(s) and History of Present Illness(es)     Posterior Vitreous Detachment Follow Up     Comments: 5 week follow up LE              Comments     Pt reports less floaters in LE. No flashes.   No eye pain today. No redness or dryness.  No changes in vision.    JAMAL Leos April 8, 2021 12:37 PM

## 2021-04-14 ENCOUNTER — OFFICE VISIT (OUTPATIENT)
Dept: NURSING | Facility: CLINIC | Age: 61
End: 2021-04-14
Payer: COMMERCIAL

## 2021-04-14 PROCEDURE — 0001A PR COVID VAC PFIZER DIL RECON 30 MCG/0.3 ML IM: CPT

## 2021-04-14 PROCEDURE — 91300 PR COVID VAC PFIZER DIL RECON 30 MCG/0.3 ML IM: CPT

## 2021-05-05 ENCOUNTER — IMMUNIZATION (OUTPATIENT)
Dept: NURSING | Facility: CLINIC | Age: 61
End: 2021-05-05
Attending: INTERNAL MEDICINE
Payer: COMMERCIAL

## 2021-05-05 PROCEDURE — 91300 PR COVID VAC PFIZER DIL RECON 30 MCG/0.3 ML IM: CPT

## 2021-05-05 PROCEDURE — 0002A PR COVID VAC PFIZER DIL RECON 30 MCG/0.3 ML IM: CPT

## 2021-05-26 ENCOUNTER — HOSPITAL ENCOUNTER (OUTPATIENT)
Dept: RESEARCH | Facility: CLINIC | Age: 61
End: 2021-05-26
Payer: COMMERCIAL

## 2021-05-26 PROCEDURE — 510N000017 HC CRU PATIENT CARE, PER 15 MIN

## 2021-05-26 PROCEDURE — 510N000009 HC RESEARCH FACILITY, PER 15 MIN

## 2021-10-24 ENCOUNTER — HEALTH MAINTENANCE LETTER (OUTPATIENT)
Age: 61
End: 2021-10-24

## 2022-10-16 ENCOUNTER — HEALTH MAINTENANCE LETTER (OUTPATIENT)
Age: 62
End: 2022-10-16

## 2022-12-03 ENCOUNTER — HEALTH MAINTENANCE LETTER (OUTPATIENT)
Age: 62
End: 2022-12-03

## 2023-03-26 ENCOUNTER — HEALTH MAINTENANCE LETTER (OUTPATIENT)
Age: 63
End: 2023-03-26

## 2024-01-13 ENCOUNTER — HEALTH MAINTENANCE LETTER (OUTPATIENT)
Age: 64
End: 2024-01-13

## 2025-01-25 ENCOUNTER — HEALTH MAINTENANCE LETTER (OUTPATIENT)
Age: 65
End: 2025-01-25

## 2025-04-12 ENCOUNTER — HEALTH MAINTENANCE LETTER (OUTPATIENT)
Age: 65
End: 2025-04-12